# Patient Record
Sex: FEMALE | Race: WHITE | NOT HISPANIC OR LATINO | Employment: UNEMPLOYED | URBAN - METROPOLITAN AREA
[De-identification: names, ages, dates, MRNs, and addresses within clinical notes are randomized per-mention and may not be internally consistent; named-entity substitution may affect disease eponyms.]

---

## 2020-06-10 ENCOUNTER — HOSPITAL ENCOUNTER (EMERGENCY)
Facility: HOSPITAL | Age: 35
Discharge: HOME | End: 2020-06-10
Attending: EMERGENCY MEDICINE
Payer: MEDICAID

## 2020-06-10 VITALS
BODY MASS INDEX: 22.81 KG/M2 | RESPIRATION RATE: 18 BRPM | WEIGHT: 154 LBS | DIASTOLIC BLOOD PRESSURE: 69 MMHG | OXYGEN SATURATION: 98 % | HEART RATE: 89 BPM | HEIGHT: 69 IN | TEMPERATURE: 99 F | SYSTOLIC BLOOD PRESSURE: 123 MMHG

## 2020-06-10 DIAGNOSIS — M25.569 KNEE PAIN: Primary | ICD-10-CM

## 2020-06-10 DIAGNOSIS — M71.22: ICD-10-CM

## 2020-06-10 LAB
B-HCG UR QL: NEGATIVE
CTP QC/QA: YES

## 2020-06-10 PROCEDURE — 81025 URINE PREGNANCY TEST: CPT | Performed by: PHYSICIAN ASSISTANT

## 2020-06-10 PROCEDURE — 25000003 PHARM REV CODE 250: Performed by: PHYSICIAN ASSISTANT

## 2020-06-10 PROCEDURE — 99284 EMERGENCY DEPT VISIT MOD MDM: CPT | Mod: 25

## 2020-06-10 RX ORDER — LIDOCAINE 50 MG/G
1 PATCH TOPICAL DAILY
Qty: 15 PATCH | Refills: 0 | Status: SHIPPED | OUTPATIENT
Start: 2020-06-10 | End: 2020-06-10 | Stop reason: SDUPTHER

## 2020-06-10 RX ORDER — LIDOCAINE 50 MG/G
1 PATCH TOPICAL DAILY
Qty: 15 PATCH | Refills: 0 | Status: SHIPPED | OUTPATIENT
Start: 2020-06-10 | End: 2020-06-25

## 2020-06-10 RX ORDER — ACETAMINOPHEN 500 MG
500 TABLET ORAL EVERY 4 HOURS PRN
Qty: 20 TABLET | Refills: 0 | Status: SHIPPED | OUTPATIENT
Start: 2020-06-10 | End: 2020-06-10 | Stop reason: SDUPTHER

## 2020-06-10 RX ORDER — OXYCODONE AND ACETAMINOPHEN 10; 325 MG/1; MG/1
1 TABLET ORAL
Status: COMPLETED | OUTPATIENT
Start: 2020-06-10 | End: 2020-06-10

## 2020-06-10 RX ORDER — ACETAMINOPHEN 500 MG
500 TABLET ORAL EVERY 4 HOURS PRN
Qty: 20 TABLET | Refills: 0 | Status: SHIPPED | OUTPATIENT
Start: 2020-06-10 | End: 2020-06-15

## 2020-06-10 RX ORDER — IBUPROFEN 600 MG/1
600 TABLET ORAL EVERY 6 HOURS PRN
Qty: 20 TABLET | Refills: 0 | Status: SHIPPED | OUTPATIENT
Start: 2020-06-10 | End: 2020-06-15

## 2020-06-10 RX ORDER — IBUPROFEN 600 MG/1
600 TABLET ORAL EVERY 6 HOURS PRN
Qty: 20 TABLET | Refills: 0 | Status: SHIPPED | OUTPATIENT
Start: 2020-06-10 | End: 2020-06-10 | Stop reason: SDUPTHER

## 2020-06-10 RX ADMIN — OXYCODONE HYDROCHLORIDE AND ACETAMINOPHEN 1 TABLET: 10; 325 TABLET ORAL at 08:06

## 2020-06-10 NOTE — PROVIDER PROGRESS NOTES - EMERGENCY DEPT.
" Emergency Department TeleTRIAGE Encounter Note      CHIEF COMPLAINT    Chief Complaint   Patient presents with    Knee Pain     Patient reports right knee pain that started approx 3 hours PTA. Denies injury/trauma/falls. States "it feels like a knot in my knee". Reports taking Ibuprofen, last taken approx 1hr PTA.        VITAL SIGNS   Initial Vitals [06/10/20 1835]   BP Pulse Resp Temp SpO2   127/80 103 18 98.5 °F (36.9 °C) 98 %      MAP       --            ALLERGIES    Review of patient's allergies indicates:   Allergen Reactions    Metoprolol        PROVIDER TRIAGE NOTE  Patient with opioid dependence presents to the ED for evaluation of knee pain.  Patient reports right knee pain that started approximately 1 month ago.  She denies trauma or injury at that time.  Since then she has felt a "knot" in her knee.  She reports no pain until approximately 3 hr ago.  She is concerned that she has a blood clot in her knee and would like it to be evaluated.    ORDERS  Labs Reviewed - No data to display    ED Orders (720h ago, onward)    None            Virtual Visit Note: The provider triage portion of this emergency department evaluation and documentation was performed via [employer], a HIPAA-compliant telemedicine application, in concert with a tele-presenter in the room. A face to face patient evaluation with one of my colleagues will occur once the patient is placed in an emergency department room.      DISCLAIMER: This note was prepared with [employer] voice recognition transcription software. Garbled syntax, mangled pronouns, and other bizarre constructions may be attributed to that software system.  "

## 2020-06-11 NOTE — ED PROVIDER NOTES
"Encounter Date: 6/10/2020    SCRIBE #1 NOTE: I, Jerry Barrow, am scribing for, and in the presence of,  Jeannette Rosario PA-C. I have scribed the following portions of the note - Other sections scribed: HPI, ROS, PE.       History     Chief Complaint   Patient presents with    Knee Pain     Patient reports right knee pain that started approx 3 hours PTA. Denies injury/trauma/falls. States "it feels like a knot in my knee". Reports taking Ibuprofen, last taken approx 1hr PTA.      CC: Knee Pain    HPI: This 35 y.o. Female with no pertinent past medical history presents to the emergency room for an evaluation of constant severe 10/10 R medial knee pain and swelling that worsened today. Pt reports swelling to the area for the past month. She has mild pain intermittently but never this severe. Pt denies fever, chills, paraesthesias, DVT, PE, chest pain, shortness of breath, cancer, recent travel, surgeries or hormone therapy. She has pain at rest that worsens with walking.    The history is provided by the patient. No  was used.     Review of patient's allergies indicates:   Allergen Reactions    Metoprolol      Past Medical History:   Diagnosis Date    Mental disorder      Past Surgical History:   Procedure Laterality Date     SECTION       Family History   Problem Relation Age of Onset    Hypertension Paternal Grandmother     Stroke Paternal Grandmother     Breast cancer Neg Hx     Colon cancer Neg Hx     Ovarian cancer Neg Hx      Social History     Tobacco Use    Smoking status: Current Some Day Smoker   Substance Use Topics    Alcohol use: No    Drug use: No     Review of Systems   Constitutional: Negative for chills and fever.   HENT: Negative for trouble swallowing.    Eyes: Negative for redness.   Respiratory: Negative for shortness of breath and stridor.    Cardiovascular: Negative for chest pain.   Gastrointestinal: Negative for nausea and vomiting.   Musculoskeletal: " Positive for arthralgias (R knee) and joint swelling (R knee). Negative for back pain and neck pain.   Skin: Negative for rash and wound.   Neurological: Negative for dizziness, speech difficulty, weakness, light-headedness and numbness.       Physical Exam     Initial Vitals [06/10/20 1835]   BP Pulse Resp Temp SpO2   127/80 103 18 98.5 °F (36.9 °C) 98 %      MAP       --         Physical Exam    Nursing note and vitals reviewed.  Constitutional: She appears well-developed and well-nourished.   HENT:   Head: Normocephalic.   Right Ear: External ear normal.   Left Ear: External ear normal.   Eyes: EOM are normal. Pupils are equal, round, and reactive to light.   Neck: Normal range of motion.   Cardiovascular: Normal rate and regular rhythm.   Pulses:       Dorsalis pedis pulses are 2+ on the right side, and 2+ on the left side.   Pulmonary/Chest: Effort normal. No respiratory distress. She has no wheezes.   Abdominal: Soft. Bowel sounds are normal. She exhibits no distension.   Musculoskeletal: Normal range of motion. She exhibits no edema or tenderness.   Swelling palpation to the medial right thigh.  No popliteal tenderness.  Tenderness palpation to the medial thigh or calf.   Neurological: She is alert and oriented to person, place, and time. She has normal strength. No sensory deficit.   Skin: Skin is warm and dry. Capillary refill takes less than 2 seconds.   Psychiatric: She has a normal mood and affect. Thought content normal.         ED Course   Procedures  Labs Reviewed   POCT URINE PREGNANCY          Imaging Results          X-Ray Knee 3 View Right (Final result)  Result time 06/10/20 22:03:32    Final result by Klaus Simmons MD (06/10/20 22:03:32)                 Impression:      No acute bony abnormality.      Electronically signed by: Klaus Simmons MD  Date:    06/10/2020  Time:    22:03             Narrative:    EXAMINATION:  XR KNEE 3 VIEW RIGHT    CLINICAL HISTORY:  Pain in unspecified  knee.    TECHNIQUE:  AP, lateral, and Merchant views of the right knee were performed.    COMPARISON:  Lower extremity ultrasound, 06/10/2020.    FINDINGS:  No evidence of acute fracture or dislocation.  No sizeable joint effusion.  Soft tissues are symmetric.  No radiopaque foreign body.                               US Lower Extremity Veins Right (Final result)  Result time 06/10/20 21:38:15    Final result by Klaus Simmons MD (06/10/20 21:38:15)                 Impression:      No evidence of deep venous thrombosis in the right lower extremity.    Small fluid collections at the medial aspect of the right knee, likely Baker's cysts.      Electronically signed by: Klaus Simmons MD  Date:    06/10/2020  Time:    21:38             Narrative:    EXAMINATION:  US LOWER EXTREMITY VEINS RIGHT    CLINICAL HISTORY:  Pain in unspecified knee.    TECHNIQUE:  Duplex and color flow Doppler evaluation and graded compression of the right lower extremity veins was performed.    COMPARISON:  None.    FINDINGS:  Right thigh veins: The common femoral, femoral, popliteal, upper greater saphenous, and deep femoral veins are patent and free of thrombus. The veins are normally compressible and have normal phasic flow and augmentation response.    Right calf veins: The visualized calf veins are patent.    Contralateral CFV: The contralateral (left) common femoral vein is patent and free of thrombus.    Miscellaneous: Anechoic lesion in the medial aspect of the right knee measures approximately 2.5 x 1.5 x 2.3 cm.  Smaller adjacent fluid collection with mild internal complexity measures approximately 1.2 x 0.8 x 1.4 cm.  No significant intralesional vascularity within either collection when interrogated with color Doppler ultrasound.                                 Medical Decision Making:   ED Management:  36 y/o female with no pertinent past medical history presenting for evaluation R knee pain. Denies trauma. Xray negative for  fracture or dislocation. US negative for DVT but remarkable for baker's cyst.  No evidence of infectious etiology.  No neurovascular deficits.  Considered but doubt septic joint.  The patient follow up with Orthopedics for further evaluation management the Baker cyst and return to ER for worsening symptoms or as needed.            Scribe Attestation:   Scribe #1: I performed the above scribed service and the documentation accurately describes the services I performed. I attest to the accuracy of the note.                          Clinical Impression:       ICD-10-CM ICD-9-CM   1. Knee pain M25.569 719.46   2. Cyst, baker's knee, left M71.22 727.51             ED Disposition Condition    Discharge Stable        ED Prescriptions     Medication Sig Dispense Start Date End Date Auth. Provider    ibuprofen (ADVIL,MOTRIN) 600 MG tablet  (Status: Discontinued) Take 1 tablet (600 mg total) by mouth every 6 (six) hours as needed for Pain. 20 tablet 6/10/2020 6/10/2020 Jeannette Rosario PA-C    acetaminophen (TYLENOL) 500 MG tablet  (Status: Discontinued) Take 1 tablet (500 mg total) by mouth every 4 (four) hours as needed. 20 tablet 6/10/2020 6/10/2020 Jeannette Rosario PA-C    lidocaine (LIDODERM) 5 %  (Status: Discontinued) Place 1 patch onto the skin once daily. Remove & Discard patch within 12 hours or as directed by MD. May use 4% over the counter if not covered by insurance for 15 days 15 patch 6/10/2020 6/10/2020 Jeannette Rosario PA-C    acetaminophen (TYLENOL) 500 MG tablet Take 1 tablet (500 mg total) by mouth every 4 (four) hours as needed. 20 tablet 6/10/2020 6/15/2020 NATE Alberts-CYNTHIA    ibuprofen (ADVIL,MOTRIN) 600 MG tablet Take 1 tablet (600 mg total) by mouth every 6 (six) hours as needed for Pain. 20 tablet 6/10/2020 6/15/2020 NATE Alberts-CYNTHIA    lidocaine (LIDODERM) 5 % Place 1 patch onto the skin once daily. Remove & Discard patch within 12 hours or as directed by MD.  May use 4% over the counter if not covered by insurance for 15 days 15 patch 6/10/2020 6/25/2020 Jeannette Rosario PA-C        Follow-up Information     Follow up With Specialties Details Why Contact Info    Max Pisano MD Family Medicine Schedule an appointment as soon as possible for a visit in 2 days for follow up [address] [facility] [id number] [phone]      [facility] Emergency Medicine Go to  As needed, If symptoms worsen [address] [place] [address] [phone]                      Scribe attestation: I, KEMAL Moe  personally performed the services described in this documentation. All medical record entries made by the scribe were at my direction and in my presence.  I have reviewed the chart and agree that the record reflects my personal performance and is accurate and complete.               Jeannette Rosario PA-C  06/11/20 0207

## 2020-06-11 NOTE — DISCHARGE INSTRUCTIONS
Take medications as prescribed. Follow up with primary care. Read attached instructions. Follow up with orthopedics for persisting symptoms. Return to ER for worsening symptoms or as needed

## 2020-09-09 ENCOUNTER — HOSPITAL ENCOUNTER (EMERGENCY)
Facility: HOSPITAL | Age: 35
Discharge: HOME | End: 2020-09-09
Attending: EMERGENCY MEDICINE
Payer: MEDICAID

## 2020-09-09 VITALS
OXYGEN SATURATION: 99 % | WEIGHT: 154 LBS | TEMPERATURE: 98 F | HEART RATE: 105 BPM | DIASTOLIC BLOOD PRESSURE: 85 MMHG | RESPIRATION RATE: 18 BRPM | BODY MASS INDEX: 22.81 KG/M2 | SYSTOLIC BLOOD PRESSURE: 130 MMHG | HEIGHT: 69 IN

## 2020-09-09 DIAGNOSIS — S92.353A CLOSED DISPLACED FRACTURE OF FIFTH METATARSAL BONE, UNSPECIFIED LATERALITY, INITIAL ENCOUNTER: Primary | ICD-10-CM

## 2020-09-09 DIAGNOSIS — T14.90XA TRAUMA: ICD-10-CM

## 2020-09-09 LAB
B-HCG UR QL: NEGATIVE
CTP QC/QA: YES

## 2020-09-09 PROCEDURE — 29515 APPLICATION SHORT LEG SPLINT: CPT | Mod: RT

## 2020-09-09 PROCEDURE — 96372 THER/PROPH/DIAG INJ SC/IM: CPT

## 2020-09-09 PROCEDURE — 99284 EMERGENCY DEPT VISIT MOD MDM: CPT | Mod: 25

## 2020-09-09 PROCEDURE — 81025 URINE PREGNANCY TEST: CPT | Performed by: EMERGENCY MEDICINE

## 2020-09-09 PROCEDURE — 63600175 PHARM REV CODE 636 W HCPCS: Performed by: NURSE PRACTITIONER

## 2020-09-09 RX ORDER — IBUPROFEN 800 MG/1
800 TABLET ORAL EVERY 8 HOURS PRN
Qty: 30 TABLET | Refills: 0 | Status: SHIPPED | OUTPATIENT
Start: 2020-09-09

## 2020-09-09 RX ORDER — KETOROLAC TROMETHAMINE 30 MG/ML
30 INJECTION, SOLUTION INTRAMUSCULAR; INTRAVENOUS
Status: COMPLETED | OUTPATIENT
Start: 2020-09-09 | End: 2020-09-09

## 2020-09-09 RX ORDER — HYDROCODONE BITARTRATE AND ACETAMINOPHEN 7.5; 325 MG/1; MG/1
1 TABLET ORAL EVERY 4 HOURS PRN
Qty: 18 TABLET | Refills: 0 | Status: SHIPPED | OUTPATIENT
Start: 2020-09-09

## 2020-09-09 RX ADMIN — KETOROLAC TROMETHAMINE 30 MG: 30 INJECTION, SOLUTION INTRAMUSCULAR at 10:09

## 2020-09-09 NOTE — ED TRIAGE NOTES
Pt presents to the ED via personal transportation reporting she fell off steps yesterday and is reporting right foot pain and swelling. Pt reports she is unable to bear weight on the right foot. Pt in NAD.

## 2020-09-09 NOTE — Clinical Note
Samra Dennis was seen and treated in our emergency department on 9/9/2020.  She may return to work after being cleared by follow-up physician 09/14/2020.       If you have any questions or concerns, please don't hesitate to call.      Haley Oliver, NP

## 2020-09-09 NOTE — ED PROVIDER NOTES
"Encounter Date: 2020    SCRIBE #1 NOTE: I, Kristawilner Padron, am scribing for, and in the presence of,  Haley Oliver NP. I have scribed the following portions of the note - Other sections scribed: HPI, ROS, PE.       History     Chief Complaint   Patient presents with    Foot Pain     fell off steps on right foot.Unable to walk on foot,Incident happened last night     CC: Foot Pain    HPI: This 35 y.o female presents to the ED c/o right foot pain that began last night. Pt reports that she was taking out the trash when she missed a step while ambulating down a set of 4 steps. She states that she subsequently fell backwards onto the right leg and heard something "pop" in the right foot. She states that she has since been unable to ambulate much on the foot. The symptoms are acute, constant and severe (8/10). She also notes abrasions to the right lower leg. Pt reports taking Ibuprofen last night for treatment of pain. No other associated symptoms. LMP is 10 days ago.    The history is provided by the patient.     Review of patient's allergies indicates:   Allergen Reactions    Metoprolol      Past Medical History:   Diagnosis Date    Mental disorder      Past Surgical History:   Procedure Laterality Date     SECTION       Family History   Problem Relation Age of Onset    Hypertension Paternal Grandmother     Stroke Paternal Grandmother     Breast cancer Neg Hx     Colon cancer Neg Hx     Ovarian cancer Neg Hx      Social History     Tobacco Use    Smoking status: Current Some Day Smoker    Smokeless tobacco: Never Used   Substance Use Topics    Alcohol use: No    Drug use: No     Review of Systems   Constitutional: Negative for fever.   HENT: Negative for sore throat.    Respiratory: Negative for shortness of breath.    Cardiovascular: Negative for chest pain.   Gastrointestinal: Negative for nausea.   Genitourinary: Negative for dysuria.   Musculoskeletal: Positive for gait problem. Negative " for back pain.        (+) right foot pain   Skin: Negative for rash.        (+) abrasions to the right lower leg   Neurological: Negative for weakness.       Physical Exam     Initial Vitals [09/09/20 0956]   BP Pulse Resp Temp SpO2   125/66 110 18 98.5 °F (36.9 °C) 96 %      MAP       --         Physical Exam    Nursing note and vitals reviewed.  Constitutional: She appears well-developed and well-nourished. She is not diaphoretic. No distress.   HENT:   Head: Normocephalic and atraumatic.   Eyes: Conjunctivae are normal.   Neck: Normal range of motion.   Cardiovascular: Normal rate.   Pulmonary/Chest: No respiratory distress.   Musculoskeletal: Tenderness present.      Comments: There is a large amount of tenderness and bruising to the dorsum of the right foot metatarsals of toes 3, 4 and 5.   Neurological: She is alert and oriented to person, place, and time. GCS score is 15. GCS eye subscore is 4. GCS verbal subscore is 5. GCS motor subscore is 6.   Skin: Skin is warm and dry. Capillary refill takes less than 2 seconds.   Psychiatric: She has a normal mood and affect.         ED Course   Splint Application    Date/Time: 9/9/2020 1:36 PM  Performed by: Haley Oliver NP  Authorized by: Iris Damon MD   Consent Done: Yes  Consent: Verbal consent obtained.  Risks and benefits: risks, benefits and alternatives were discussed  Consent given by: patient  Patient understanding: patient states understanding of the procedure being performed  Splint type: short leg  Supplies used: Ortho-Glass  Post-procedure: The splinted body part was neurovascularly unchanged following the procedure.  Patient tolerance: Patient tolerated the procedure well with no immediate complications        Labs Reviewed   POCT URINE PREGNANCY          Imaging Results          X-Ray Foot Complete Right (Final result)  Result time 09/09/20 12:13:33    Final result by Austen Woods MD (09/09/20 12:13:33)                 Impression:       Fifth metatarsal acute fracture, as above.      Electronically signed by: Austen Woods MD  Date:    09/09/2020  Time:    12:13             Narrative:    EXAMINATION:  XR FOOT COMPLETE 3 VIEW RIGHT    CLINICAL HISTORY:  . Injury, unspecified, initial encounter    TECHNIQUE:  AP, lateral, and oblique views of the right foot were performed.    COMPARISON:  None    FINDINGS:  Bones are well mineralized.  There is an acute spiral type fracture involving the distal 3rd of the 5th metatarsal diaphysis with slight comminution about the fracture site as well as mild displacement and mild apex angulation towards the medial and plantar aspect.  No dislocation or destructive osseous process.  Mild hallux valgus configuration and mild degenerative change at the 1st MTP joint.  Small plantar calcaneal spur.  There is associated overlying soft tissue swelling about the fracture site.  No subcutaneous emphysema or radiodense retained foreign body.                                 Medical Decision Making:   Differential Diagnosis:   Fracture, sprain, soft tissue injury  ED Management:  Diagnosis management comments: This is an urgent evaluation of a 35-year-old female that presented to the ER with c/o right foot pain. Pts exam was as above.      xrays were reviewed and discussed with pt.  Patient has fracture of 5th metatarsal.  Short-leg posterior splint applied.  Crutches given.  Patient given small amount of opioid pain medication and instructions to follow-up with orthopedic surgeon for evaluation of fracture.    Based on exam today - I have low suspicion for medical, surgical or other life threatening condition and I believe pt is safe for discharge and outpatient f/u.    Pt verbalizes understanding of d/c instructions and will return for worsening condition.                Scribe Attestation:   Scribe #1: I performed the above scribed service and the documentation accurately describes the services I performed. I attest to  the accuracy of the note.                      Clinical Impression:       ICD-10-CM ICD-9-CM   1. Closed displaced fracture of fifth metatarsal bone, unspecified laterality, initial encounter  S92.353A 825.25   2. Trauma  T14.90XA 959.9         Disposition:   Disposition: Discharged  Condition: Stable     ED Disposition Condition    Discharge Stable        ED Prescriptions     Medication Sig Dispense Start Date End Date Auth. Provider    HYDROcodone-acetaminophen (NORCO) 7.5-325 mg per tablet Take 1 tablet by mouth every 4 (four) hours as needed for Pain. 18 tablet 9/9/2020  Haley Oliver NP    ibuprofen (ADVIL,MOTRIN) 800 MG tablet Take 1 tablet (800 mg total) by mouth every 8 (eight) hours as needed for Pain. 30 tablet 9/9/2020  Haley Oliver NP        Follow-up Information     Follow up With Specialties Details Why Contact Info    Ivanna Mccarthy MD Orthopedic Surgery Schedule an appointment as soon as possible for a visit   [address] [facility] [address]  [phone]      [facility] Emergency Medicine  If symptoms worsen or any other concerns [address] [facility] [id number] [phone]                      IJAMAAL NP-C  , personally performed the services described in this documentation. All medical record entries made by the scribe were at my direction and in my presence. I have reviewed the chart and agree that the record reflects my personal performance and is accurate and complete.                 Haley Oliver NP  09/09/20 1394

## 2020-09-09 NOTE — Clinical Note
Samra Dennis was seen and treated in our emergency department on 9/9/2020.  She may return to work on 09/12/2020.       If you have any questions or concerns, please don't hesitate to call.      Haley Oliver NP

## 2021-04-26 ENCOUNTER — PATIENT MESSAGE (OUTPATIENT)
Dept: RESEARCH | Facility: HOSPITAL | Age: 36
End: 2021-04-26

## 2022-11-06 ENCOUNTER — HOSPITAL ENCOUNTER (EMERGENCY)
Facility: HOSPITAL | Age: 37
Discharge: HOME | End: 2022-11-06
Attending: EMERGENCY MEDICINE
Payer: MEDICAID

## 2022-11-06 VITALS
DIASTOLIC BLOOD PRESSURE: 64 MMHG | OXYGEN SATURATION: 100 % | BODY MASS INDEX: 25.18 KG/M2 | RESPIRATION RATE: 18 BRPM | TEMPERATURE: 99 F | WEIGHT: 170 LBS | SYSTOLIC BLOOD PRESSURE: 128 MMHG | HEART RATE: 75 BPM | HEIGHT: 69 IN

## 2022-11-06 DIAGNOSIS — R10.12 LEFT UPPER QUADRANT ABDOMINAL PAIN: Primary | ICD-10-CM

## 2022-11-06 DIAGNOSIS — K59.00 CONSTIPATION: ICD-10-CM

## 2022-11-06 LAB
ALBUMIN SERPL BCP-MCNC: 4.3 G/DL (ref 3.5–5.2)
ALP SERPL-CCNC: 190 U/L (ref 55–135)
ALT SERPL W/O P-5'-P-CCNC: 33 U/L (ref 10–44)
ANION GAP SERPL CALC-SCNC: 9 MMOL/L (ref 8–16)
AST SERPL-CCNC: 27 U/L (ref 10–40)
B-HCG UR QL: NEGATIVE
BASOPHILS # BLD AUTO: 0.03 K/UL (ref 0–0.2)
BASOPHILS NFR BLD: 0.3 % (ref 0–1.9)
BILIRUB SERPL-MCNC: 0.3 MG/DL (ref 0.1–1)
BILIRUB UR QL STRIP: NEGATIVE
BUN SERPL-MCNC: 6 MG/DL (ref 6–20)
CALCIUM SERPL-MCNC: 10.2 MG/DL (ref 8.7–10.5)
CHLORIDE SERPL-SCNC: 100 MMOL/L (ref 95–110)
CLARITY UR: CLEAR
CO2 SERPL-SCNC: 31 MMOL/L (ref 23–29)
COLOR UR: YELLOW
CREAT SERPL-MCNC: 0.8 MG/DL (ref 0.5–1.4)
CTP QC/QA: YES
DIFFERENTIAL METHOD: ABNORMAL
EOSINOPHIL # BLD AUTO: 0.1 K/UL (ref 0–0.5)
EOSINOPHIL NFR BLD: 1.2 % (ref 0–8)
ERYTHROCYTE [DISTWIDTH] IN BLOOD BY AUTOMATED COUNT: 12.5 % (ref 11.5–14.5)
EST. GFR  (NO RACE VARIABLE): >60 ML/MIN/1.73 M^2
GLUCOSE SERPL-MCNC: 99 MG/DL (ref 70–110)
GLUCOSE UR QL STRIP: NEGATIVE
HCT VFR BLD AUTO: 42.7 % (ref 37–48.5)
HGB BLD-MCNC: 13.9 G/DL (ref 12–16)
HGB UR QL STRIP: NEGATIVE
IMM GRANULOCYTES # BLD AUTO: 0.06 K/UL (ref 0–0.04)
IMM GRANULOCYTES NFR BLD AUTO: 0.6 % (ref 0–0.5)
KETONES UR QL STRIP: NEGATIVE
LEUKOCYTE ESTERASE UR QL STRIP: NEGATIVE
LIPASE SERPL-CCNC: 5 U/L (ref 4–60)
LYMPHOCYTES # BLD AUTO: 2.5 K/UL (ref 1–4.8)
LYMPHOCYTES NFR BLD: 26.6 % (ref 18–48)
MCH RBC QN AUTO: 29.1 PG (ref 27–31)
MCHC RBC AUTO-ENTMCNC: 32.6 G/DL (ref 32–36)
MCV RBC AUTO: 89 FL (ref 82–98)
MONOCYTES # BLD AUTO: 0.3 K/UL (ref 0.3–1)
MONOCYTES NFR BLD: 3.5 % (ref 4–15)
NEUTROPHILS # BLD AUTO: 6.4 K/UL (ref 1.8–7.7)
NEUTROPHILS NFR BLD: 67.8 % (ref 38–73)
NITRITE UR QL STRIP: NEGATIVE
NRBC BLD-RTO: 0 /100 WBC
OCCULT BLOOD: NEGATIVE
PH UR STRIP: 7 [PH] (ref 5–8)
PLATELET # BLD AUTO: 279 K/UL (ref 150–450)
PMV BLD AUTO: 10.5 FL (ref 9.2–12.9)
POTASSIUM SERPL-SCNC: 5 MMOL/L (ref 3.5–5.1)
PROT SERPL-MCNC: 7.8 G/DL (ref 6–8.4)
PROT UR QL STRIP: NEGATIVE
RBC # BLD AUTO: 4.78 M/UL (ref 4–5.4)
SODIUM SERPL-SCNC: 140 MMOL/L (ref 136–145)
SP GR UR STRIP: 1.01 (ref 1–1.03)
URN SPEC COLLECT METH UR: NORMAL
UROBILINOGEN UR STRIP-ACNC: NEGATIVE EU/DL
WBC # BLD AUTO: 9.4 K/UL (ref 3.9–12.7)

## 2022-11-06 PROCEDURE — 85025 COMPLETE CBC W/AUTO DIFF WBC: CPT

## 2022-11-06 PROCEDURE — 81003 URINALYSIS AUTO W/O SCOPE: CPT

## 2022-11-06 PROCEDURE — 80053 COMPREHEN METABOLIC PANEL: CPT

## 2022-11-06 PROCEDURE — 99284 EMERGENCY DEPT VISIT MOD MDM: CPT | Mod: 25

## 2022-11-06 PROCEDURE — 25000003 PHARM REV CODE 250

## 2022-11-06 PROCEDURE — 83690 ASSAY OF LIPASE: CPT

## 2022-11-06 PROCEDURE — 81025 URINE PREGNANCY TEST: CPT | Performed by: NURSE PRACTITIONER

## 2022-11-06 RX ORDER — SODIUM CHLORIDE 9 MG/ML
1000 INJECTION, SOLUTION INTRAVENOUS
Status: COMPLETED | OUTPATIENT
Start: 2022-11-06 | End: 2022-11-06

## 2022-11-06 RX ORDER — ONDANSETRON 4 MG/1
4 TABLET, FILM COATED ORAL EVERY 6 HOURS PRN
Qty: 20 TABLET | Refills: 0 | Status: SHIPPED | OUTPATIENT
Start: 2022-11-06 | End: 2022-11-11

## 2022-11-06 RX ORDER — ONDANSETRON 4 MG/1
4 TABLET, FILM COATED ORAL EVERY 6 HOURS PRN
Qty: 20 TABLET | Refills: 0 | Status: SHIPPED | OUTPATIENT
Start: 2022-11-06 | End: 2022-11-06 | Stop reason: SDUPTHER

## 2022-11-06 RX ADMIN — SODIUM CHLORIDE 1000 ML: 0.9 INJECTION, SOLUTION INTRAVENOUS at 05:11

## 2022-11-06 NOTE — ED NOTES
Patient c/o constipation for approximately 2 weeks. Patient states that she had one episode of diarrhea two days ago, but it was a small amount. Also c/o bloating.

## 2022-11-07 NOTE — DISCHARGE INSTRUCTIONS
You may keep taking senna and Colace, but avoid the MiraLax.  Try to stay as hydrated as possible.  Speak to your primary care provider and whoever manages your Subutex about these side effects.  They may be able to add or adjust medications to avoid these side effects.    Thank you for coming to our Emergency Department today. It is important to remember that some problems are difficult to diagnose and may not be found during your first visit. Be sure to follow up with your primary care doctor and review any labs/imaging that was performed with them. If you do not have a primary care doctor, you may contact the one listed on your discharge paperwork or you may also call the [facility] Appointment Desk at [phone] to schedule an appointment with one.     All medications may potentially have side effects and it is impossible to predict which medications may give you side effects. If you feel that you are having a negative effect of any medication you should immediately stop taking them and seek medical attention.    Return to the ER with any questions/concerns, new/concerning symptoms, worsening or failure to improve. Do not drive or make any important decisions for 24 hours if you have received any pain medications, sedatives or mood altering drugs during your ER visit.

## 2022-11-07 NOTE — ED PROVIDER NOTES
Encounter Date: 2022       History     Chief Complaint   Patient presents with    Abdominal Pain     Patient reports no BM in 2 weeks, has abdominal pain and swelling feels nauseated after trying to eat or drink     Samra Dennis is a 37-year-old female with past medical history of psoriasis, rheumatoid arthritis, depression, anxiety, and opioid abuse disorder currently being successfully treated with Suboxone who presents to the emergency department with chief complaint of constipation.  She reports that she has chronic constipation due to her Suboxone use and normally has bowel movements approximately once every 10-14 days.  She reports that her last bowel movement was approximately 13-14 days ago there than 1 episode of small volume diarrhea approximately 2 days ago.  She presented to the emergency department today because she has been having 2 weeks of left upper quadrant abdominal pain and today when she tried to drink water she became nauseous.  She also realized that she has not been passing gas, which is abnormal for her.  She has tried taking senna, Colace, and MiraLax to relieve her symptoms with minimal relief.  She has a surgical history including 2 C sections, the most recent  as well as a tubal ligation.  She reports that her paternal grandfather  of pancreatic cancer.      Review of patient's allergies indicates:   Allergen Reactions    Meloxicam Other (See Comments)     Pt states she got real tired and states she was dizzy and vomiting    Metoprolol      Past Medical History:   Diagnosis Date    Mental disorder      Past Surgical History:   Procedure Laterality Date     SECTION       Family History   Problem Relation Age of Onset    Hypertension Paternal Grandmother     Stroke Paternal Grandmother     Breast cancer Neg Hx     Colon cancer Neg Hx     Ovarian cancer Neg Hx      Social History     Tobacco Use    Smoking status: Some Days    Smokeless tobacco: Never   Substance Use  Topics    Alcohol use: No    Drug use: No     Review of Systems   Constitutional:  Negative for chills, diaphoresis, fatigue, fever and unexpected weight change.   HENT:  Negative for sinus pain and sore throat.    Respiratory:  Negative for cough, chest tightness and shortness of breath.    Cardiovascular:  Negative for chest pain and palpitations.   Gastrointestinal:  Positive for abdominal distention, constipation, diarrhea and nausea. Negative for abdominal pain, anal bleeding, blood in stool, rectal pain and vomiting.   Genitourinary:  Negative for dysuria, frequency and urgency.   Musculoskeletal:  Positive for arthralgias (Chronic) and joint swelling (Chronic). Negative for myalgias.   Skin:  Negative for rash.   Allergic/Immunologic: Negative for environmental allergies.   Neurological:  Negative for dizziness, syncope, weakness and headaches.   Psychiatric/Behavioral:  Negative for dysphoric mood and suicidal ideas.      Physical Exam     Initial Vitals [11/06/22 1530]   BP Pulse Resp Temp SpO2   129/68 94 18 98.3 °F (36.8 °C) 97 %      MAP       --         Physical Exam    Nursing note and vitals reviewed.  Constitutional: She appears well-developed and well-nourished. She is not diaphoretic. She is cooperative. She does not appear ill. No distress.   HENT:   Head: Normocephalic and atraumatic.   Right Ear: Hearing, tympanic membrane, external ear and ear canal normal.   Left Ear: Hearing, tympanic membrane, external ear and ear canal normal.   Nose: Rhinorrhea present. No mucosal edema or sinus tenderness. Right sinus exhibits no maxillary sinus tenderness and no frontal sinus tenderness. Left sinus exhibits no maxillary sinus tenderness and no frontal sinus tenderness.   Mouth/Throat: Oropharynx is clear and moist and mucous membranes are normal. No posterior oropharyngeal edema, posterior oropharyngeal erythema or tonsillar abscesses.   Eyes: Conjunctivae and EOM are normal. Pupils are equal, round, and  reactive to light.   Neck: Neck supple.    Full passive range of motion without pain.     Cardiovascular:  Normal rate, regular rhythm, S1 normal, S2 normal, normal heart sounds and normal pulses.           No murmur heard.  Pulses:       Radial pulses are 2+ on the right side and 2+ on the left side.        Dorsalis pedis pulses are 2+ on the right side and 2+ on the left side.   Pulmonary/Chest: Effort normal and breath sounds normal. No accessory muscle usage. No respiratory distress.   Abdominal: Abdomen is soft and flat. Bowel sounds are normal. She exhibits no distension. There is abdominal tenderness in the left upper quadrant and left lower quadrant.   Moderate tenderness to deep palpation in the left upper quadrant, mostly behind the ribs.  Mild tenderness to palpation in the left lower quadrant. No masses felt.  No rigidity, rebound, or guarding.   No right CVA tenderness.  No left CVA tenderness. There is no rebound, no guarding and negative Pavon's sign.   Genitourinary:    Rectum normal.   Rectum:      Guaiac result negative.      No rectal mass, anal fissure, tenderness, external hemorrhoid, internal hemorrhoid or abnormal anal tone.   Guaiac negative stool. : Acceptable.   Genitourinary Comments: Normal rectal exam, no hemorrhoids, masses, fissures, FOBT negative.  No hard stool felt in the rectal vault.  Small amount of soft stool on glove.     Musculoskeletal:      Cervical back: Full passive range of motion without pain and neck supple. No edema or rigidity. No muscular tenderness. Normal range of motion.     Neurological: She is alert.   Skin: Skin is warm and dry. Capillary refill takes less than 2 seconds. No abrasion, no lesion and no rash noted. No pallor.   Psychiatric: She has a normal mood and affect. Her speech is normal and behavior is normal.       ED Course   Procedures  Labs Reviewed   CBC W/ AUTO DIFFERENTIAL - Abnormal; Notable for the following components:        Result Value    Immature Granulocytes 0.6 (*)     Immature Grans (Abs) 0.06 (*)     Mono % 3.5 (*)     All other components within normal limits   COMPREHENSIVE METABOLIC PANEL - Abnormal; Notable for the following components:    CO2 31 (*)     Alkaline Phosphatase 190 (*)     All other components within normal limits   URINALYSIS, REFLEX TO URINE CULTURE    Narrative:     Specimen Source->Urine   LIPASE   POCT URINE PREGNANCY          Imaging Results              X-Ray Abdomen Flat And Erect (Final result)  Result time 11/06/22 17:46:26      Final result by Darline Méndez MD (11/06/22 17:46:26)                   Impression:      Nonobstructive bowel gas pattern.      Electronically signed by: Darline Méndez MD  Date:    11/06/2022  Time:    17:46               Narrative:    EXAMINATION:  XR ABDOMEN FLAT AND ERECT    CLINICAL HISTORY:  Constipation, unspecified    TECHNIQUE:  Flat and erect AP views of the abdomen were performed.    COMPARISON:  None    FINDINGS:  Nonspecific bowel gas pattern.  There is mild gaseous distention of the splenic flexure of the colon.  No evidence to suggest obstruction.  No free air identified.  Scattered stool is seen in the colon, more pronounced within the right colon.  Partially visualized lung bases are clear.                                       Medications   0.9%  NaCl infusion (1,000 mLs Intravenous New Bag 11/6/22 3527)     Medical Decision Making:   Initial Assessment:   37-year-old female presenting to the emergency department with chief complaint of abdominal pain and constipation.  Differential Diagnosis:   Differential diagnosis includes but not limited to opioid induced constipation, functional constipation, GI bleed, or small-bowel obstruction.  Clinical Tests:   Lab Tests: Ordered and Reviewed       <> Summary of Lab: CBC, CMP, lipase all within normal limits.  UPT negative.  Urinalysis with no abnormalities.  Radiological Study: Ordered and Reviewed  ED  Management:  37-year-old female with reported history of chronic constipation secondary to Subutex use.  Reporting to the emergency department today because she has also developed left upper quadrant abdominal pain over the last 2 weeks and became nauseous drinking water over the last day.  Patient was in no acute distress and had vital signs that remained within normal limits throughout her course in the emergency department.  No rigidity, rebound, or guarding on abdominal exam.  Guaiac negative.  Bowel sounds were present in all 4 quadrants and epigastrium. Moderate tenderness to palpation in the left upper quadrant on exam.  As such, I obtained a abdominal x-ray, CBC, CMP, urinalysis, and lipase to assess for intra-abdominal etiology of her pain. CBC, CMP, urinalysis, and lipase all within normal limits.  Abdominal x-ray with nonobstructive bowel pattern.  Given these factors, is most likely that this patient is suffering from continued opioid induced constipation.  She was given 1 L of fluids in the emergency department.  We discussed the option of Relistor to reverse her opioid induced constipation at least temporarily.  However, the patient voiced a preference to continue with home stimulant laxatives and stool softeners that she has prescribed from her primary care provider.  I agree that this is a reasonable course of action.    Return precautions were discussed, all patient questions were answered, and the patient was agreeable to the plan of care.  She was discharged home in stable condition with a prescription for Zofran for symptomatic relief of her nausea so that she can keep down more fluids.                        Clinical Impression:   Final diagnoses:  [K59.00] Constipation  [R10.12] Left upper quadrant abdominal pain (Primary)        ED Disposition Condition    Discharge Stable          ED Prescriptions       Medication Sig Dispense Start Date End Date Auth. Provider    ondansetron (ZOFRAN) 4 MG  tablet  (Status: Discontinued) Take 1 tablet (4 mg total) by mouth every 6 (six) hours as needed for Nausea. 20 tablet 11/6/2022 11/6/2022 Quinton Chapman PA-C    ondansetron (ZOFRAN) 4 MG tablet Take 1 tablet (4 mg total) by mouth every 6 (six) hours as needed for Nausea. 20 tablet 11/6/2022 11/11/2022 Quinton Chapman PA-C          Follow-up Information       Follow up With Specialties Details Why Contact Info    Max Pisano MD Family Medicine Schedule an appointment as soon as possible for a visit in 2 days If symptoms worsen [address] [facility] [address] [phone]               Quinton Chapman PA-C  11/06/22 0917

## 2023-01-18 ENCOUNTER — HOSPITAL ENCOUNTER (OUTPATIENT)
Dept: RADIOLOGY | Facility: HOSPITAL | Age: 38
Discharge: HOME | End: 2023-01-18
Attending: NURSE PRACTITIONER
Payer: MEDICAID

## 2023-01-18 VITALS — HEIGHT: 69 IN | BODY MASS INDEX: 25.18 KG/M2 | WEIGHT: 170 LBS

## 2023-01-18 DIAGNOSIS — Z12.31 ENCOUNTER FOR SCREENING MAMMOGRAM FOR MALIGNANT NEOPLASM OF BREAST: ICD-10-CM

## 2023-01-18 PROCEDURE — 77063 MAMMO DIGITAL SCREENING BILAT WITH TOMO: ICD-10-PCS | Mod: 26,,, | Performed by: RADIOLOGY

## 2023-01-18 PROCEDURE — 77067 SCR MAMMO BI INCL CAD: CPT | Mod: 26,,, | Performed by: RADIOLOGY

## 2023-01-18 PROCEDURE — 77067 MAMMO DIGITAL SCREENING BILAT WITH TOMO: ICD-10-PCS | Mod: 26,,, | Performed by: RADIOLOGY

## 2023-01-18 PROCEDURE — 77063 BREAST TOMOSYNTHESIS BI: CPT | Mod: 26,,, | Performed by: RADIOLOGY

## 2023-01-18 PROCEDURE — 77067 SCR MAMMO BI INCL CAD: CPT | Mod: TC

## 2025-03-19 ENCOUNTER — HOSPITAL ENCOUNTER (EMERGENCY)
Facility: HOSPITAL | Age: 40
Discharge: HOME | End: 2025-03-19
Attending: EMERGENCY MEDICINE
Payer: MEDICAID

## 2025-03-19 VITALS
TEMPERATURE: 98 F | HEART RATE: 101 BPM | HEIGHT: 69 IN | BODY MASS INDEX: 23.55 KG/M2 | WEIGHT: 159 LBS | SYSTOLIC BLOOD PRESSURE: 141 MMHG | RESPIRATION RATE: 18 BRPM | OXYGEN SATURATION: 98 % | DIASTOLIC BLOOD PRESSURE: 86 MMHG

## 2025-03-19 DIAGNOSIS — G43.909 MIGRAINE WITHOUT STATUS MIGRAINOSUS, NOT INTRACTABLE, UNSPECIFIED MIGRAINE TYPE: Primary | ICD-10-CM

## 2025-03-19 LAB
B-HCG UR QL: NEGATIVE
CTP QC/QA: YES

## 2025-03-19 PROCEDURE — 96372 THER/PROPH/DIAG INJ SC/IM: CPT | Performed by: NURSE PRACTITIONER

## 2025-03-19 PROCEDURE — 99284 EMERGENCY DEPT VISIT MOD MDM: CPT | Mod: 25

## 2025-03-19 PROCEDURE — 63600175 PHARM REV CODE 636 W HCPCS: Mod: JZ,TB | Performed by: NURSE PRACTITIONER

## 2025-03-19 PROCEDURE — 81025 URINE PREGNANCY TEST: CPT | Performed by: NURSE PRACTITIONER

## 2025-03-19 RX ORDER — BUTALBITAL, ACETAMINOPHEN AND CAFFEINE 50; 325; 40 MG/1; MG/1; MG/1
1 TABLET ORAL EVERY 4 HOURS PRN
Qty: 20 TABLET | Refills: 0 | Status: SHIPPED | OUTPATIENT
Start: 2025-03-19 | End: 2025-04-18

## 2025-03-19 RX ORDER — KETOROLAC TROMETHAMINE 30 MG/ML
15 INJECTION, SOLUTION INTRAMUSCULAR; INTRAVENOUS
Status: COMPLETED | OUTPATIENT
Start: 2025-03-19 | End: 2025-03-19

## 2025-03-19 RX ORDER — METOCLOPRAMIDE HYDROCHLORIDE 5 MG/ML
10 INJECTION INTRAMUSCULAR; INTRAVENOUS
Status: COMPLETED | OUTPATIENT
Start: 2025-03-19 | End: 2025-03-19

## 2025-03-19 RX ORDER — METOCLOPRAMIDE 10 MG/1
1 TABLET ORAL 4 TIMES DAILY
COMMUNITY

## 2025-03-19 RX ADMIN — KETOROLAC TROMETHAMINE 15 MG: 30 INJECTION, SOLUTION INTRAMUSCULAR; INTRAVENOUS at 05:03

## 2025-03-19 RX ADMIN — METOCLOPRAMIDE HYDROCHLORIDE 10 MG: 5 INJECTION INTRAMUSCULAR; INTRAVENOUS at 05:03

## 2025-03-19 NOTE — ED PROVIDER NOTES
Encounter Date: 3/19/2025       History     Chief Complaint   Patient presents with    Headache     Pt reports to Ed for migraine since Saturday with stiff neck, nausea with not tingling noted to top of head.      31-year-old female presents with throbbing headache with associated photophobia and nausea x2 days.  She states that she is concerned because she has not had a migrainei n greater than 5 years and has been taking ibuprofen/Tylenol/Excedrin around the clock without any improvement.  Patient denies fever, change in vision, head tremor or any other associated symptoms      Review of patient's allergies indicates:   Allergen Reactions    Meloxicam Other (See Comments)     Pt states she got real tired and states she was dizzy and vomiting    Metoprolol      Past Medical History:   Diagnosis Date    Mental disorder      Past Surgical History:   Procedure Laterality Date     SECTION       Family History   Problem Relation Name Age of Onset    Hypertension Paternal Grandmother      Stroke Paternal Grandmother      Breast cancer Other Great AUnt     Colon cancer Neg Hx      Ovarian cancer Neg Hx       Social History[1]  Review of Systems   Constitutional:  Negative for fever.   HENT:  Negative for sore throat.    Respiratory:  Negative for shortness of breath.    Cardiovascular:  Negative for chest pain.   Gastrointestinal:  Positive for nausea.   Genitourinary:  Negative for dysuria.   Musculoskeletal:  Negative for back pain.   Skin:  Negative for rash.   Neurological:  Positive for headaches. Negative for weakness.   Hematological:  Does not bruise/bleed easily.       Physical Exam     Initial Vitals [25 1640]   BP Pulse Resp Temp SpO2   (!) 141/86 101 18 98 °F (36.7 °C) 98 %      MAP       --         Physical Exam    Nursing note and vitals reviewed.  Constitutional: She appears well-developed and well-nourished.   HENT:   Head: Normocephalic.   Eyes: Conjunctivae and EOM are normal. Pupils are  equal, round, and reactive to light.   Neck: Neck supple.   Normal range of motion.  Musculoskeletal:         General: Normal range of motion.      Cervical back: Normal range of motion and neck supple.     Neurological: She is alert and oriented to person, place, and time. She has normal strength and normal reflexes. GCS score is 15. GCS eye subscore is 4. GCS verbal subscore is 5. GCS motor subscore is 6.   Skin: Skin is warm and dry. Capillary refill takes less than 2 seconds.   Psychiatric: She has a normal mood and affect.         ED Course   Procedures  Labs Reviewed   POCT URINE PREGNANCY       Result Value    POC Preg Test, Ur Negative       Acceptable Yes            Imaging Results              CT Head Without Contrast (Final result)  Result time 03/19/25 17:13:20      Final result by Alex Gill MD (03/19/25 17:13:20)                   Impression:      No acute intracranial process.  Additional evaluation, as clinically warranted.      Electronically signed by: Alex Gill MD  Date:    03/19/2025  Time:    17:13               Narrative:    EXAMINATION:  CT HEAD WITHOUT CONTRAST    CLINICAL HISTORY:  Headache, chronic, new features or increased frequency;    TECHNIQUE:  Low dose axial images were obtained through the head.  Coronal and sagittal reformations were also performed. Contrast was not administered.    COMPARISON:  None.    FINDINGS:  The subcutaneous tissue are unremarkable.  The bony calvarium is intact.  The paranasal sinuses are unremarkable.  The mastoid air cells are clear.  The orbits and intraorbital contents are within normal limits.    The craniocervical junction is intact.  The sellar and parasellar are unremarkable.  There are no extra-axial fluid collections.  There is no evidence of intracranial hemorrhage.    The ventricles and sulci are within normal limits.  The cisterns are unremarkable.  The gray-white differentiation is maintained.  There is no dense vessel  sign.  There is nodes of mass effect.                                       Medications   ketorolac injection 15 mg (15 mg Intramuscular Given 3/19/25 1713)   metoclopramide injection 10 mg (10 mg Intramuscular Given 3/19/25 1714)     Medical Decision Making  Diagnosis management comments: This is an urgent evaluation of a 40-year-old female  that presented to the ER with c/o migraine headache . Pts exam was as above.     Labs and CT were reviewed and discussed with pt. patient was treated with Toradol and Reglan in emergency department.  Upon reassessment patient states that headache is much improved although still not completely gone.  She states she is ready to be discharged home.  She is requesting medication for discharge.  I will give her Fioricet encouraged her to follow-up with her PCP for ongoing management of her migraine headaches.    Based on exam today - I have low suspicion for medical, surgical or other life threatening condition and I believe pt is safe for discharge and outpatient f/u.    Pt verbalizes understanding of d/c instructions and will return for worsening condition.          Amount and/or Complexity of Data Reviewed  Labs: ordered.  Radiology: ordered.    Risk  Prescription drug management.                                      Clinical Impression:  Final diagnoses:  [G43.909] Migraine without status migrainosus, not intractable, unspecified migraine type (Primary)          ED Disposition Condition    Discharge Stable          ED Prescriptions       Medication Sig Dispense Start Date End Date Auth. Provider    butalbital-acetaminophen-caffeine -40 mg (FIORICET, ESGIC) -40 mg per tablet Take 1 tablet by mouth every 4 (four) hours as needed for Pain. 20 tablet 3/19/2025 4/18/2025 Haley Oliver NP          Follow-up Information       Follow up With Specialties Details Why Contact Info    Max Pisano MD Family Medicine Schedule an appointment as soon as possible for a  visit   [address] [facility] [address]  [phone]      [facility] - Emergency Dept Emergency Medicine  If symptoms worsen or any other concerns [address] [facility] [place] [address] [phone]                 [1]   Social History  Tobacco Use    Smoking status: Some Days    Smokeless tobacco: Never   Substance Use Topics    Alcohol use: No    Drug use: No        Haley Oliver NP  03/19/25 4825

## 2025-03-19 NOTE — Clinical Note
"Samra"Cezar Dennis was seen and treated in our emergency department on 3/19/2025.  She may return to work on 03/21/2025.       If you have any questions or concerns, please don't hesitate to call.      Haley Oliver NP"